# Patient Record
Sex: MALE | Race: WHITE | NOT HISPANIC OR LATINO | Employment: FULL TIME | RURAL
[De-identification: names, ages, dates, MRNs, and addresses within clinical notes are randomized per-mention and may not be internally consistent; named-entity substitution may affect disease eponyms.]

---

## 2023-12-13 ENCOUNTER — HOSPITAL ENCOUNTER (EMERGENCY)
Facility: HOSPITAL | Age: 43
Discharge: HOME | End: 2023-12-13

## 2023-12-13 VITALS
HEART RATE: 76 BPM | RESPIRATION RATE: 18 BRPM | TEMPERATURE: 98 F | WEIGHT: 218.81 LBS | OXYGEN SATURATION: 98 % | SYSTOLIC BLOOD PRESSURE: 127 MMHG | BODY MASS INDEX: 28.08 KG/M2 | DIASTOLIC BLOOD PRESSURE: 77 MMHG | HEIGHT: 74 IN

## 2023-12-13 DIAGNOSIS — R21 RASH AND NONSPECIFIC SKIN ERUPTION: Primary | ICD-10-CM

## 2023-12-13 PROCEDURE — 99284 EMERGENCY DEPT VISIT MOD MDM: CPT | Mod: ,,,

## 2023-12-13 PROCEDURE — 99284 PR EMERGENCY DEPT VISIT,LEVEL IV: ICD-10-PCS | Mod: ,,,

## 2023-12-13 PROCEDURE — 99284 EMERGENCY DEPT VISIT MOD MDM: CPT

## 2023-12-13 PROCEDURE — 96372 THER/PROPH/DIAG INJ SC/IM: CPT

## 2023-12-13 PROCEDURE — 63600175 PHARM REV CODE 636 W HCPCS

## 2023-12-13 RX ORDER — TRIAMCINOLONE ACETONIDE 1 MG/G
CREAM TOPICAL 2 TIMES DAILY
Qty: 453 G | Refills: 0 | Status: SHIPPED | OUTPATIENT
Start: 2023-12-13

## 2023-12-13 RX ORDER — DEXAMETHASONE SODIUM PHOSPHATE 4 MG/ML
4 INJECTION, SOLUTION INTRA-ARTICULAR; INTRALESIONAL; INTRAMUSCULAR; INTRAVENOUS; SOFT TISSUE
Status: COMPLETED | OUTPATIENT
Start: 2023-12-13 | End: 2023-12-13

## 2023-12-13 RX ORDER — MUPIROCIN 20 MG/G
OINTMENT TOPICAL 3 TIMES DAILY
Qty: 1 G | Refills: 0 | Status: SHIPPED | OUTPATIENT
Start: 2023-12-13

## 2023-12-13 RX ORDER — CLINDAMYCIN HYDROCHLORIDE 300 MG/1
300 CAPSULE ORAL EVERY 6 HOURS
Qty: 28 CAPSULE | Refills: 0 | Status: SHIPPED | OUTPATIENT
Start: 2023-12-13 | End: 2023-12-20

## 2023-12-13 RX ADMIN — DEXAMETHASONE SODIUM PHOSPHATE 4 MG: 4 INJECTION, SOLUTION INTRA-ARTICULAR; INTRALESIONAL; INTRAMUSCULAR; INTRAVENOUS; SOFT TISSUE at 03:12

## 2023-12-13 NOTE — DISCHARGE INSTRUCTIONS
Take and apply medication as directed by the label on the bottle, follow up with local PCP as needed if symptoms continue, return to the ER if you experience any chest pain, shortness of breath, and or symptoms worsen.    The examination and treatment you have received in the Emergency Department today have been rendered on an emergency basis only and are not intended to be a substitute for an effort to provide complete medical care. You should contact your follow-up physician as it is important that you let him or her check you and report any new or remaining problems since it is impossible to recognize and treat all elements of an injury or illness in a single emergency care center visit.

## 2023-12-13 NOTE — ED PROVIDER NOTES
Encounter Date: 12/13/2023       History     Chief Complaint   Patient presents with    Rash     Patient is a 44 y/o  male with no significant PMHx presents to the Emergency Department with complaint of petechial rash to right and left side of abdomen.  Patient stated that his rash has come and gone for the past 1 month, was applying Bactroban ointment to rash, symptoms alleviated the patient had run out of Bactroban.  Patient denies any changes in deodorant, washing powder, body wash, however does endorse exposure to work place irritants.    The history is provided by the patient.   Rash   This is a recurrent problem. The problem has been unchanged. The problem is associated with an unknown factor. The rash is present on the abdomen. The pain is at a severity of 0/10. Pertinent negatives include no itching. Treatments tried: Mupirocin. The treatment provided moderate relief.     Review of patient's allergies indicates:   Allergen Reactions    Pcn [penicillins]      History reviewed. No pertinent past medical history.  History reviewed. No pertinent surgical history.  History reviewed. No pertinent family history.  Social History     Tobacco Use    Smoking status: Every Day     Current packs/day: 1.00     Types: Cigarettes     Review of Systems   Constitutional: Negative.    HENT: Negative.     Eyes: Negative.    Respiratory: Negative.     Cardiovascular: Negative.    Gastrointestinal: Negative.    Endocrine: Negative.    Genitourinary: Negative.    Musculoskeletal: Negative.    Skin:  Positive for rash. Negative for itching.   Allergic/Immunologic: Negative.    Neurological: Negative.    Hematological: Negative.    Psychiatric/Behavioral: Negative.         Physical Exam     Initial Vitals [12/13/23 1455]   BP Pulse Resp Temp SpO2   127/77 76 18 97.9 °F (36.6 °C) 98 %      MAP       --         Physical Exam    Nursing note and vitals reviewed.  Constitutional: Vital signs are normal. He appears  well-developed and well-nourished. He is not diaphoretic. He is cooperative.  Non-toxic appearance. He does not have a sickly appearance. He does not appear ill. No distress.   Cardiovascular:  Normal rate, regular rhythm, S1 normal, S2 normal, normal heart sounds, intact distal pulses and normal pulses.     Exam reveals no gallop, no S3, no S4, no distant heart sounds and no friction rub.       No murmur heard.  No systolic murmur is present.  No diastolic murmur is present.  Pulmonary/Chest: Effort normal and breath sounds normal.   Abdominal: Abdomen is soft and flat. Bowel sounds are normal. There is no abdominal tenderness. No hernia.     Lymphadenopathy:     He has no cervical adenopathy.     He has no axillary adenopathy.   Neurological: He is alert and oriented to person, place, and time. He has normal strength and normal reflexes. He displays normal reflexes. No cranial nerve deficit or sensory deficit. He displays a negative Romberg sign. GCS eye subscore is 4. GCS verbal subscore is 5. GCS motor subscore is 6.   Skin: Skin is warm, dry and intact. Capillary refill takes less than 2 seconds. Rash noted.              Medical Screening Exam   See Full Note    ED Course   Procedures  Labs Reviewed - No data to display       Imaging Results    None          Medications   dexAMETHasone injection 4 mg (has no administration in time range)     Medical Decision Making  Patient is a 44 y/o  male with no significant PMHx presents to the Emergency Department with complaint of petechial rash to right and left side of abdomen.  Patient stated that his rash has come and gone for the past 1 month, was applying Bactroban ointment to rash, symptoms alleviated the patient had run out of Bactroban.  Patient denies any changes in deodorant, washing powder, body wash, however does endorse exposure to work place irritants.    The history is provided by the patient.   Rash   This is a recurrent problem. The problem has  been unchanged. The problem is associated with an unknown factor. The rash is present on the abdomen. The pain is at a severity of 0/10. Pertinent negatives include no itching. Treatments tried: Mupirocin. The treatment provided moderate relief.       Risk  Prescription drug management.               ED Course as of 12/13/23 1516   Wed Dec 13, 2023   1512 Discharge instructions given along with strict return precautions patient verbalizes understanding. [AC]      ED Course User Index  [AC] Amol Farley FNP                           Clinical Impression:   Final diagnoses:  [R21] Rash and nonspecific skin eruption (Primary)               Amol Farley FNP  12/13/23 1516

## 2023-12-13 NOTE — Clinical Note
"Harinder"Caridad Corea was seen and treated in our emergency department on 12/13/2023.  He may return to work on 12/14/2023.       If you have any questions or concerns, please don't hesitate to call.      Amol Farley, KARI"

## 2023-12-13 NOTE — ED TRIAGE NOTES
Presents to ED with complaints of rash along abdomen. States it occasionally spreads to thighs. Patient states it started about a month and a half ago and that he used a cream his grandson had and it almost went completely away and then came back way worse. He states that it itches when it dries out. Patient believes that something at his job at this [employer] causes it to flare up. Rash is noted to both side of abdomen and appears to be groupings of many raised red little bumps.